# Patient Record
Sex: FEMALE | Race: BLACK OR AFRICAN AMERICAN | URBAN - METROPOLITAN AREA
[De-identification: names, ages, dates, MRNs, and addresses within clinical notes are randomized per-mention and may not be internally consistent; named-entity substitution may affect disease eponyms.]

---

## 2018-10-31 ENCOUNTER — MEDICAL CORRESPONDENCE (OUTPATIENT)
Dept: HEALTH INFORMATION MANAGEMENT | Facility: CLINIC | Age: 48
End: 2018-10-31

## 2018-11-14 ENCOUNTER — OFFICE VISIT (OUTPATIENT)
Dept: OPHTHALMOLOGY | Facility: CLINIC | Age: 48
End: 2018-11-14
Attending: OPHTHALMOLOGY
Payer: COMMERCIAL

## 2018-11-14 DIAGNOSIS — H52.03 HYPEROPIA WITH ASTIGMATISM AND PRESBYOPIA, BILATERAL: ICD-10-CM

## 2018-11-14 DIAGNOSIS — H25.13 AGE-RELATED NUCLEAR CATARACT OF BOTH EYES: ICD-10-CM

## 2018-11-14 DIAGNOSIS — H52.203 HYPEROPIA WITH ASTIGMATISM AND PRESBYOPIA, BILATERAL: ICD-10-CM

## 2018-11-14 DIAGNOSIS — H04.123 DRY EYE SYNDROME, BILATERAL: ICD-10-CM

## 2018-11-14 DIAGNOSIS — E08.3313 MODERATE NONPROLIFERATIVE DIABETIC RETINOPATHY OF BOTH EYES WITH MACULAR EDEMA ASSOCIATED WITH DIABETES MELLITUS DUE TO UNDERLYING CONDITION (H): Primary | ICD-10-CM

## 2018-11-14 DIAGNOSIS — H52.4 HYPEROPIA WITH ASTIGMATISM AND PRESBYOPIA, BILATERAL: ICD-10-CM

## 2018-11-14 PROCEDURE — G0463 HOSPITAL OUTPT CLINIC VISIT: HCPCS | Mod: ZF

## 2018-11-14 PROCEDURE — 92134 CPTRZ OPH DX IMG PST SGM RTA: CPT | Mod: ZF | Performed by: STUDENT IN AN ORGANIZED HEALTH CARE EDUCATION/TRAINING PROGRAM

## 2018-11-14 ASSESSMENT — SLIT LAMP EXAM - LIDS
COMMENTS: NORMAL
COMMENTS: NORMAL

## 2018-11-14 ASSESSMENT — CUP TO DISC RATIO
OD_RATIO: 0.3
OS_RATIO: 0.3

## 2018-11-14 ASSESSMENT — CONF VISUAL FIELD
OD_NORMAL: 1
OS_NORMAL: 1

## 2018-11-14 ASSESSMENT — REFRACTION_MANIFEST
OS_ADD: +2.50
OS_CYLINDER: +0.50
OD_ADD: +2.50
OD_SPHERE: +0.50
OD_CYLINDER: +0.50
OD_AXIS: 160
OS_AXIS: 020
OS_SPHERE: +0.25

## 2018-11-14 ASSESSMENT — EXTERNAL EXAM - RIGHT EYE: OD_EXAM: NORMAL

## 2018-11-14 ASSESSMENT — VISUAL ACUITY
OD_SC: 20/60
METHOD: SNELLEN - LINEAR
OD_PH_CC: 20/20
OS_SC: 20/20

## 2018-11-14 ASSESSMENT — TONOMETRY
OD_IOP_MMHG: 18
OS_IOP_MMHG: 19
IOP_METHOD: TONOPEN

## 2018-11-14 ASSESSMENT — EXTERNAL EXAM - LEFT EYE: OS_EXAM: NORMAL

## 2018-11-14 NOTE — NURSING NOTE
Chief Complaints and History of Present Illnesses   Patient presents with     Yearly Exam     DM exam     HPI    Affected eye(s):  Both   Symptoms:     No floaters   No flashes   No glare   No halos      Frequency:  Constant       Do you have eye pain now?:  No      Comments:  Diabetic exam  Reading vision decreased  [facility] Avastin X3 [facility] RE  blood sugar 135 this am  A1C 8 taken 2 weeks ago  Aria CARMICHAEL 3:18 PM November 14, 2018

## 2018-11-14 NOTE — MR AVS SNAPSHOT
After Visit Summary   2018    Jadiel Meneses    MRN: [id number]           Patient Information     Date Of Birth          1970        Visit Information        Provider Department      2018 3:00 PM [facility]        Today's Diagnoses     Moderate nonproliferative diabetic retinopathy of both eyes with macular edema associated with diabetes mellitus due to underlying condition (H)    -  1    Age-related nuclear cataract of both eyes        Hyperopia with astigmatism and presbyopia, bilateral        Dry eye syndrome, bilateral           Follow-ups after your visit        Follow-up notes from your care team     Return in about 3 months (around 2019) for Follow Up, Retina, VTD, mac oct ou, DM.      Your next 10 appointments already scheduled     2019  3:00 PM CST   NEW GENERAL with Breezy Macdonald MD   [facility] ([facility])    [address] [address]   [phone]              Who to contact     Please call your clinic at [phone] to:    Ask questions about your health    Make or cancel appointments    Discuss your medicines    Learn about your test results    Speak to your doctor            Additional Information About Your Visit        MyChart Information     [employer] is an electronic gateway that provides easy, online access to your medical records. With [employer], you can request a clinic appointment, read your test results, renew a prescription or communicate with your care team.     To sign up for [employer] visit the website at [email or web address]   You will be asked to enter the access code listed below, as well as some personal information. Please follow the directions to create your username and password.     Your access code is: [email or web address]  Expires: 2019  6:31 AM     Your access code will  in 90 days. If you need help or a new code,  please contact your [facility] or call [phone] for assistance.        Care EveryWhere ID     This is your Care EveryWhere ID. This could be used by other organizations to access your [facility] medical records  [id number]         Blood Pressure from Last 3 Encounters:   08/15/10 122/88    Weight from Last 3 Encounters:   08/15/10 84.8 kg (187 lb)              We Performed the Following     OCT Retina Spectralis OU (both eyes)        Primary Care Provider Office Phone # Fax #    [facility] [phone]       [address] [place] [id number]        Equal Access to Services     AFIA VERGARA : Hadii aad ku hadasho Soomaali, waaxda luqadaha, qaybta kaalmada adeegyada, sofiya britton hayaan adecm peters . So [facility] [phone].    ATENCIÓN: Si habla español, tiene a rankin disposición servicios gratuitos de asistencia lingüística. [facility] [phone].    We comply with applicable federal civil rights laws and [place] laws. We do not discriminate on the basis of race, color, national origin, age, disability, sex, sexual orientation, or gender identity.            Thank you!     Thank you for choosing [facility]  for your care. Our goal is always to provide you with excellent care. Hearing back from our patients is one way we can continue to improve our services. Please take a few minutes to complete the written survey that you may receive in the mail after your visit with us. Thank you!             Your Updated Medication List - Protect others around you: Learn how to safely use, store and throw away your medicines at www.disposemymeds.org.          This list is accurate as of 11/14/18  4:36 PM.  Always use your most recent med list.                   Brand Name Dispense Instructions for use Diagnosis    METFORMIN HCL      None Entered

## 2018-11-14 NOTE — PROGRESS NOTES
HPI: Jadiel Meneses is a 47 year old female who presents for annual exam. Referred by outside provider for diabetic eye exam. Last A1c 8.1. Has had T2DM for 19 years. On oral agents. Reports that she has difficulty reading and is seeking glasses. Denies eye pain, redness, diplopia, new flashes/floaters.     POHx:  None    FH:  Negative for glaucoma, AMD    Current Eye Medications:   None    Review of Testing:  NA    Assessment & Plan   Jadiel Meneses is a 47 year old female with the following diagnoses:     1. Type 2 Diabetes with Moderate NPDR with CSME   Discussed importance of good BS/BP control   No treatment based upon OCT and good visual acuity    2. Hyperopia with astigmatism and presbyopia, bilateral:   Discussed and dispensed new MRx    3. Cataract, bilateral:   Visually non-significant   -Monitor    4. Dry eye syndrome, bilateral:   -Start artificial tears four times daily prn     Discussed with Dr. Hutson. Schedule with retina in 4-6 months. VTD OCT both eyes     Freddy Cooper MD  Ophthalmology, PGY-4        Teaching statement:  Complete documentation of historical and exam elements from today's encounter can be found in the full encounter summary report (not reduplicated in this progress note). I personally obtained the chief complaint(s) and history of present illness.  I confirmed and edited as necessary the review of systems, past medical/surgical history, family history, social history, and examination findings as documented by others; and I examined the patient myself. I personally reviewed the relevant tests, images, and reports as documented above.     I formulated and edited as necessary the assessment and plan and discussed the findings and management plan with the patient and family.    Laura Hutson MD  Comprehensive Ophthalmology & Ocular Pathology  Department of Ophthalmology and Visual Neurosciences  [email or web address]  Pager [phone]